# Patient Record
Sex: MALE | Race: WHITE | Employment: STUDENT | URBAN - NONMETROPOLITAN AREA
[De-identification: names, ages, dates, MRNs, and addresses within clinical notes are randomized per-mention and may not be internally consistent; named-entity substitution may affect disease eponyms.]

---
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
## 2025-04-09 DIAGNOSIS — L73.9 FOLLICULITIS: Primary | ICD-10-CM

## 2025-04-09 RX ORDER — CLINDAMYCIN PHOSPHATE 11.9 MG/ML
SOLUTION TOPICAL 2 TIMES DAILY
Qty: 30 ML | Refills: 0 | Status: SHIPPED | OUTPATIENT
Start: 2025-04-09 | End: 2025-04-23

## 2025-06-07 NOTE — TELEPHONE ENCOUNTER
Patient's Mom calling to see about getting a lab order for sickle cell.  Patient is playing sports in college and this is mandatory.  Patient last seen 12/26/23 for acute visit.   Mom states the college is doing the physicals.  Please let Mom know.    Health Maintenance   Topic Date Due    COVID-19 Vaccine (1) Never done    HPV vaccine (1 - Male 2-dose series) Never done    HIV screen  Never done    Meningococcal (ACWY) vaccine (2 - 2-dose series) 08/16/2021    Hepatitis C screen  Never done    Hepatitis A vaccine (2 of 2 - 2-dose series) 03/14/2024    Flu vaccine (1) Never done    Depression Screen  12/26/2024    DTaP/Tdap/Td vaccine (7 - Td or Tdap) 05/24/2028    Hepatitis B vaccine  Completed    Hib vaccine  Completed    Polio vaccine  Completed    Measles,Mumps,Rubella (MMR) vaccine  Completed    Varicella vaccine  Completed    Pneumococcal 0-64 years Vaccine  Aged Out             (applicable per patient's age: Cancer Screenings, Depression Screening, Fall Risk Screening, Immunizations)    AST (U/L)   Date Value   12/26/2023 15     ALT (U/L)   Date Value   12/26/2023 12     BUN (mg/dL)   Date Value   12/26/2023 12      (goal A1C is < 7)   (goal LDL is <100) need 30-50% reduction from baseline     BP Readings from Last 3 Encounters:   12/26/23 118/80   12/07/22 122/80 (65 %, Z = 0.39 /  87 %, Z = 1.13)*   11/29/22 122/70 (65 %, Z = 0.39 /  56 %, Z = 0.15)*     *BP percentiles are based on the 2017 AAP Clinical Practice Guideline for boys    (goal /80)      All Future Testing planned in [facility]:      Next Visit Date:  No future appointments.         There is no problem list on file for this patient.    
notified  
ordered  
Gen: No acute distress, non toxic  HEENT: Mucous membranes moist, pink conjunctivae, EOMI. PERRL. Airway patent.   CV: RRR, nl s1/s2.  Resp: CTAB, normal rate and effort. No wheezes, rhonchi, or crackles.   GI: Abdomen soft, NT, ND. No rebound, no guarding  Neuro: A&O x4, MAEx4. 5/5 str ext x 4. Sensation intact, symmetric throughout. No FND's.   MSK: No midline spinal ttp. No visualized or palpable deformities.  Skin: No rashes, skin intact and well perfused. Cap refill <2sec  Vascular: Radial and dorsalis pedal pulses 2+ b/l

## 2025-07-25 ENCOUNTER — OFFICE VISIT (OUTPATIENT)
Dept: FAMILY MEDICINE CLINIC | Age: 20
End: 2025-07-25

## 2025-07-25 ENCOUNTER — TELEPHONE (OUTPATIENT)
Dept: FAMILY MEDICINE CLINIC | Age: 20
End: 2025-07-25

## 2025-07-25 VITALS
DIASTOLIC BLOOD PRESSURE: 78 MMHG | WEIGHT: 206.3 LBS | HEART RATE: 81 BPM | BODY MASS INDEX: 28.88 KG/M2 | HEIGHT: 71 IN | SYSTOLIC BLOOD PRESSURE: 112 MMHG | OXYGEN SATURATION: 97 %

## 2025-07-25 DIAGNOSIS — L25.5 RHUS DERMATITIS: Primary | ICD-10-CM

## 2025-07-25 RX ORDER — TRIAMCINOLONE ACETONIDE 40 MG/ML
40 INJECTION, SUSPENSION INTRA-ARTICULAR; INTRAMUSCULAR ONCE
Status: COMPLETED | OUTPATIENT
Start: 2025-07-25 | End: 2025-07-25

## 2025-07-25 RX ADMIN — TRIAMCINOLONE ACETONIDE 40 MG: 40 INJECTION, SUSPENSION INTRA-ARTICULAR; INTRAMUSCULAR at 09:05

## 2025-07-25 SDOH — ECONOMIC STABILITY: FOOD INSECURITY: WITHIN THE PAST 12 MONTHS, YOU WORRIED THAT YOUR FOOD WOULD RUN OUT BEFORE YOU GOT MONEY TO BUY MORE.: NEVER TRUE

## 2025-07-25 SDOH — ECONOMIC STABILITY: FOOD INSECURITY: WITHIN THE PAST 12 MONTHS, THE FOOD YOU BOUGHT JUST DIDN'T LAST AND YOU DIDN'T HAVE MONEY TO GET MORE.: NEVER TRUE

## 2025-07-25 ASSESSMENT — ENCOUNTER SYMPTOMS
VOMITING: 0
COUGH: 0
DIARRHEA: 0
NAUSEA: 0
SHORTNESS OF BREATH: 0

## 2025-07-25 ASSESSMENT — PATIENT HEALTH QUESTIONNAIRE - PHQ9
1. LITTLE INTEREST OR PLEASURE IN DOING THINGS: NOT AT ALL
2. FEELING DOWN, DEPRESSED OR HOPELESS: NOT AT ALL
SUM OF ALL RESPONSES TO PHQ QUESTIONS 1-9: 0

## 2025-07-25 NOTE — PROGRESS NOTES
HPI Notes    Name: Curtis Bruce  : 2005         Chief Complaint:     Chief Complaint   Patient presents with    Poison Ivy     On the trunk of the abdomen, left arm and on the right bicep by new tattoo.        History of Present Illness:        Poison Ivy  This is a new problem. The current episode started in the past 7 days. The problem has been waxing and waning since onset. The affected locations include the chest, left arm and right arm. The rash is characterized by redness and itchiness. Pertinent negatives include no cough, diarrhea, fever, shortness of breath or vomiting. Past treatments include anti-itch cream. The treatment provided mild relief.       Past Medical History:     No past medical history on file.   Reviewed all health maintenance requirements and ordered appropriate tests  Health Maintenance Due   Topic Date Due    HPV vaccine (1 - Male 3-dose series) Never done    HIV screen  Never done    Hepatitis C screen  Never done    COVID-19 Vaccine ( season) Never done    Depression Screen  2024       Past Surgical History:     No past surgical history on file.     Medications:       Prior to Admission medications    Medication Sig Start Date End Date Taking? Authorizing Provider   azithromycin (ZITHROMAX) 250 MG tablet 500mg on day 1 followed by 250mg on days 2 - 5  Patient not taking: Reported on 2025 3/22/23   Lillian Noel DO   clindamycin-benzoyl peroxide (BENZACLIN) 1-5 % gel Apply topically 2 times daily.  Patient not taking: Reported on 22   Adi Rome DNP        Allergies:       Amoxicillin    Social History:     Tobacco:    reports that he has never smoked. His smokeless tobacco use includes chew.  Alcohol:      reports no history of alcohol use.  Drug Use:  reports no history of drug use.    Family History:     No family history on file.    Review of Systems:         Review of Systems   Constitutional:  Negative for chills and fever.

## 2025-07-25 NOTE — PATIENT INSTRUCTIONS
SURVEY:    You may be receiving a [facility] from [facility].    You may get this in the mail, through your MyChart or in your email.     Please complete the survey to enable us to provide the highest quality of care to you and your family.      Thank you.    Clinical Care Team:         ROBBIE Gupta-ALEX Kwok                         Triage:         ALEX Aguirre    Clerical Team:      Elizabeth Lopez       [place] [phone]           Billing questions: [phone]           Medical Records Request: [phone]